# Patient Record
(demographics unavailable — no encounter records)

---

## 2025-02-04 NOTE — ASSESSMENT
[FreeTextEntry1] : HM - Continue healthy meals and snacks, and  physical activity as tolerated for weight reduction/maintenance. Vaccine information in chart.Prevnar 20 today. Lab ordered as below.  Orders and referral provided as below.   Patient is here for episodic care. All patient questions answered today and understood by patient. Henceforth, Patient to schedule follow up in 3-4 months, or if new symptoms, questions, renewals or health concerns.

## 2025-02-04 NOTE — PHYSICAL EXAM
[Alert] : alert [No Acute Distress] : in no acute distress [Sclera] : the sclera and conjunctiva were normal [Normal Outer Ear/Nose] : the ears and nose were normal in appearance [Supple] : the neck was supple [No Respiratory Distress] : no respiratory distress [No Acc Muscle Use] : no accessory muscle use [Respiration, Rhythm And Depth] : normal respiratory rhythm and effort [Auscultation Breath Sounds / Voice Sounds] : lungs were clear to auscultation bilaterally [Normal S1, S2] : normal S1 and S2 [Heart Rate And Rhythm] : heart rate was normal and rhythm regular [Bowel Sounds] : normal bowel sounds [Abdomen Tenderness] : non-tender [Abdomen Soft] : soft [No Clubbing, Cyanosis] : no clubbing or cyanosis of the fingernails [Motor Tone] : muscle strength and tone were normal [Normal Color / Pigmentation] : normal skin color and pigmentation [No Focal Deficits] : no focal deficits [Normal Affect] : the affect was normal [Normal Mood] : the mood was normal

## 2025-02-04 NOTE — HISTORY OF PRESENT ILLNESS
[No falls in past year] : Patient reported no falls in the past year [Full assistance needed] : Assistance needed managing medications [] : Assistance needed managing finances. [Smoke Detector] : smoke detector [0] : 2) Feeling down, depressed, or hopeless: Not at all (0) [PHQ-2 Negative - No further assessment needed] : PHQ-2 Negative - No further assessment needed [Patient/Caregiver not ready to engage] : , patient/caregiver not ready to engage [FreeTextEntry1] : Ms. RC MORALES is a 94 year old woman with pmhx of HTN, HLD, T2DM, Anemia, GERD, insomnia, history of hyperkalemia, macular degeneration, neuropathy, chronic hip pain and gait instability who presents for a geriatric assessment.  She is accompanied by daughter who helps provide history.  Patient endorses being in overall good mood and health.  She complains of sleep problems, chronic, she needs refills for temazepam.  She is taking other medications as prescribed.  Had flu shot and COVID shot recently.  Amenable to getting pneumonia shot today.   She she is from [place].  Her medical records will be faxed over to us for review.  [[id number]] : 0

## 2025-05-19 NOTE — PHYSICAL EXAM
[Alert] : alert [Sclera] : the sclera and conjunctiva were normal [Normal Outer Ear/Nose] : the ears and nose were normal in appearance [Supple] : the neck was supple [No Respiratory Distress] : no respiratory distress [No Acc Muscle Use] : no accessory muscle use [Respiration, Rhythm And Depth] : normal respiratory rhythm and effort [Auscultation Breath Sounds / Voice Sounds] : lungs were clear to auscultation bilaterally [Normal S1, S2] : normal S1 and S2 [Heart Rate And Rhythm] : heart rate was normal and rhythm regular [Bowel Sounds] : normal bowel sounds [Abdomen Tenderness] : non-tender [Abdomen Soft] : soft [No Clubbing, Cyanosis] : no clubbing or cyanosis of the fingernails [Motor Tone] : muscle strength and tone were normal [Normal Color / Pigmentation] : normal skin color and pigmentation [No Focal Deficits] : no focal deficits [Normal Affect] : the affect was normal [Normal Mood] : the mood was normal

## 2025-05-19 NOTE — HISTORY OF PRESENT ILLNESS
[No falls in past year] : Patient reported no falls in the past year [Full assistance needed] : Assistance needed managing medications [] : Assistance needed managing finances. [Smoke Detector] : smoke detector [0] : 2) Feeling down, depressed, or hopeless: Not at all (0) [PHQ-2 Negative - No further assessment needed] : PHQ-2 Negative - No further assessment needed [Patient/Caregiver not ready to engage] : , patient/caregiver not ready to engage [FreeTextEntry1] : Ms. RC MORALES is a 94 year old woman with pmhx of HTN, HLD, T2DM, CKD w hyperurecemia, Anemia, GERD, insomnia, history of hyperkalemia, macular degeneration, neuropathy, chronic hip pain and gait instability who presents for a follow up.   She is accompanied by daughter who helps provide history.   She complains of sleep problems, chronic, she has been on temazepam. Daughter states this medication is not covered by insurance. She wants to try get coverage for different medication. Insurance recommended ambien. We discussed trial of ramelteon. We discussed taper done temazepam before stopping it.    She has anemia, chronic kidney disease with hyperuricemia. She is amenable for repeat blood test today.  She needs referral for specialist, offered today.    [[id number]] : 0

## 2025-05-19 NOTE — ASSESSMENT
[FreeTextEntry1] : Chronic insomnia (780.52) (F51.04) sent trial of coverate for ramelteon and zolpidem. Pt previously 15mg daily in 2024, now on 7.5mg, discussed tapering off.   T2DM (type 2 diabetes mellitus) (250.00) (E11.9)Other diabetic neurological complication associated with type 2 diabetes mellitus(250.60) (E11.49) last a1c 6.3 stable . c/w metformin and glipizide. monitor sugars at home with  glucometer at home. follow up with optha and podiatry as needed. labs today.   Benign essential hypertension (401.1) (I10) stable. c.w current medications. monitor bp at home  Hyperlipidemia (272.4) (E78.5)  c/w atorvastatin 10mg daily.   Hip pain, chronic (719.45,338.29) (M25.559,G89.29) Gait instability (781.2) (R26.81) PT referral ordered  Anemia, unspecified type (285.9) (D64.9) likely anemia of chronic kidney disease. on iron supplemnetation in the past. nephrology follow up for epo consideration.   Chronic GERD (530.81) (K21.9)  c/w ppi therapy  HM - Continue healthy meals and snacks, and  physical activity as tolerated for weight reduction/maintenance. Vaccine information in chart.Prevnar 20 today. Lab ordered as below.  Orders and referral provided as below.   Patient is here for episodic care. All patient questions answered today and understood by patient. Henceforth, Patient to schedule follow up in 3-4 months, or if new symptoms, questions, renewals or health concerns.

## 2025-06-02 NOTE — DISCUSSION/SUMMARY
[de-identified] : 94yF pw endstage R hip OA.  Well controlled DM, a1c 6.4, ambulatory, active.   The patient was extensively counseled on treatment options including but not limited to observation, rest/activity modification, bracing, anti-inflammatory medications, physical therapy, injections, and surgery.  The natural history of the disease was thoroughly explained.    R/b/a to hip arthroplasty discussed.  Pt would like to discuss in further detail, will see my joints partner    I have personally obtained the history, reviewed the ROS as noted, and performed the physical examination today.  The patient and I discussed the assessment and options and developed the plan.  All questions were answered and the patient stated their understanding of the treatment plan and appreciation of the visit.   My cumulative time spent on this patient's visit included: Preparation for the visit, review of the medical records, review of pertinent diagnostic studies, examination and counseling of the patient on the above diagnosis, treatment plan and prognosis, orders of diagnostic tests, medications and/or appropriate procedures and documentation in the medical records of today's visit.   Gian Monteiro MD

## 2025-06-02 NOTE — PHYSICAL EXAM
[de-identified] : Gen: NAD Resp: Nonlabored respirations, no SOB Gait: nl  Side: Skin in tact Tenderness:   Hip ROM                               Flexion              Extension               IR               ER Affected               90                    10                         0              30 Normal                  100                    10                         25              40   Strength                              Flexion              Extension           Abduction        Adduction  Affected               5                        5                               5                     5                     Normal                  5                        5                               5                     5  Provocative Tests: Log roll  (-) FADIR   (+) ALEX   (-) Tangela   (-) Resisted SLR  (-)   [de-identified] : The following radiographs were ordered and read by me during this patient's visit. I reviewed each radiograph in detail with the patient and discussed the findings as highlighted below.   2 views of the R hip were obtained today that show no fracture, or dislocation. There are endstage degenerative changes seen. There is no malalignment. No obvious osseous abnormality. Otherwise unremarkable.

## 2025-06-02 NOTE — HISTORY OF PRESENT ILLNESS
[de-identified] : 94yF referred by Dr. Vega.  presents with increasing hip and groin pain.  Pt notes extending sitting and ascending/descending stairs worsens the hip pain and symptoms.  Does not recall an inciting traumatic event.  Pt has tried activity modification and NSAIDs with minimal relief, pain level remains a 10/10.  Feels activity level has changed and difficulty participating in sports.  Some tightness noted in hip.  Has not trialed physical therapy or injections.  Pt denies numbness, paresthesias, f/c.  (+) clicking inside hip Pt notes the pain interferes with activities of daily life and that overall mobility has been decreased.  They wish to discuss possible treatment options and return to baseline activity and mobility before symptomatic onset.  C/s inj 1y helped minimally, mild help OT/PT.

## 2025-06-09 NOTE — PHYSICAL EXAM
[General Appearance - Alert] : alert [General Appearance - Well Nourished] : well nourished [General Appearance - In No Acute Distress] : in no acute distress [Sclera] : the sclera and conjunctiva were normal [Outer Ear] : the ears and nose were normal in appearance [Neck Appearance] : the appearance of the neck was normal [Neck Cervical Mass (___cm)] : no neck mass was observed [Respiration, Rhythm And Depth] : normal respiratory rhythm and effort [Heart Rate And Rhythm] : heart rate was normal and rhythm regular [Exaggerated Use Of Accessory Muscles For Inspiration] : no accessory muscle use [Murmurs] : no murmurs [Heart Sounds Pericardial Friction Rub] : no pericardial rub [Edema] : there was no peripheral edema [Veins - Varicosity Changes] : there were no varicosital changes [Abdomen Soft] : soft [Abdomen Tenderness] : non-tender [Abdomen Mass (___ Cm)] : no abdominal mass palpated [No Spinal Tenderness] : no spinal tenderness [No CVA Tenderness] : no ~M costovertebral angle tenderness [Musculoskeletal - Swelling] : no joint swelling seen [Involuntary Movements] : no involuntary movements were seen [Skin Color & Pigmentation] : normal skin color and pigmentation [] : no rash [Skin Lesions] : no skin lesions [Impaired Insight] : insight and judgment were intact [Affect] : the affect was normal [Mood] : the mood was normal [FreeTextEntry1] : Came in a wheelchair.

## 2025-06-09 NOTE — HISTORY OF PRESENT ILLNESS
[FreeTextEntry1] :  Ms. RC MORALES is a 94-year-old woman with a PMH of HTN, HLD, T2DM, CKD w hyperurecemia, Anemia, GERD, insomnia, history of hyperkalemia, macular degeneration, neuropathy, chronic hip pain and gait instability who [facility] management of CKD.  Patient is accompanied by her daughter (Callie)  Kidney history: Ms. MORALES has a baseline serum creatinine of 1.3-1.5mg/dL, with a corresponding eGFR of 31-37 ml/min/1.73m2.  Workup done: Urinalysis - March 2025 - UTI UACR - Feb 2025 - 452 mg/g Paraproteinemia workup - no previous tests done.  Kidney imaging-no previous tests done   DM: Diagnosed in the 1970s Currently on glipizide and metformin.    HTN: Diagnosed in 1970s Currently on metoprolol, lisinopril, furosemide.  She follows a low salt diet.   Nephrolithiasis: None   No family history of kidney disease. Daughter (Callie) developed RICHARD in her 30s (etiology unclear). She was on dialysis temporarily. Now she has CKD.    She denies having nausea/vomiting/diarrhea. She has a good appetite. She denies hematuria, frothy urine or dysuria. She denies shortness of breath, chest pain, headache, edema. No hand tremors. She denies skin rash, joint pains or hair loss. She denies NSAID use or herbal supplements.

## 2025-06-09 NOTE — ASSESSMENT
[FreeTextEntry1] :   1. Chronic kidney disease stage 3b. Patient's baseline serum creatinine is 1.3-1.5mg/dL, with a corresponding eGFR of around 31 ml/min/1.73m2. The etiology of CKD is unclear but likely related to diabetic nephropathy and aging nephrosclerosis.  We will repeat urinalysis, UACR, UPCR today. I will order viral serology and do a paraproteinemia workup. I will order a renal ultrasound and bladder.  Plan:  - In order to slow progression, patient is advised have good blood pressure and blood glucose control and to avoid NSAIDs.  2. Abuminuria - likely due to diabetic nephropathy. I will repeat it today. She is to continue lisinopril 20mg daily for anti-proteinuric effect.   3. HTN - Goal BP for patient is < 150/85. Patient's current BP is controlled Plan: - Continue lisonopril and metoprolol.  - Low salt diet recommended.  4. Hyperkalemia Patient likes to take oranges and bananas.  Low K discussed. Handout given to patient.    5. Anemia of CKD - goal Hb is 10-11.5. her current Hb is 10g/L. I will check iron studies.  - EPO is not indicated at the moment.    6. Medication toxicity monitoring. Medication dose reviewed. Since she is taking lisinopril, we will monitor for hyperkalemia. Her latest potassium level is acceptable.    Patient is recommended to follow up in 6 months. >50% of the encounter was spent discussing about kidney function, stages of CKD, and steps to slow progression of kidney disease.